# Patient Record
Sex: MALE | Race: WHITE
[De-identification: names, ages, dates, MRNs, and addresses within clinical notes are randomized per-mention and may not be internally consistent; named-entity substitution may affect disease eponyms.]

---

## 2018-02-24 ENCOUNTER — HOSPITAL ENCOUNTER (EMERGENCY)
Dept: HOSPITAL 75 - ER | Age: 38
LOS: 1 days | Discharge: HOME | End: 2018-02-25
Payer: MEDICARE

## 2018-02-24 VITALS — WEIGHT: 185 LBS | BODY MASS INDEX: 30.82 KG/M2 | HEIGHT: 65 IN

## 2018-02-24 DIAGNOSIS — R07.89: ICD-10-CM

## 2018-02-24 DIAGNOSIS — R10.13: Primary | ICD-10-CM

## 2018-02-24 DIAGNOSIS — F17.210: ICD-10-CM

## 2018-02-24 DIAGNOSIS — R06.00: ICD-10-CM

## 2018-02-24 PROCEDURE — 93005 ELECTROCARDIOGRAM TRACING: CPT

## 2018-02-24 PROCEDURE — 94640 AIRWAY INHALATION TREATMENT: CPT

## 2018-02-24 PROCEDURE — 94664 DEMO&/EVAL PT USE INHALER: CPT

## 2018-02-24 PROCEDURE — 83735 ASSAY OF MAGNESIUM: CPT

## 2018-02-24 PROCEDURE — 71046 X-RAY EXAM CHEST 2 VIEWS: CPT

## 2018-02-24 PROCEDURE — 85025 COMPLETE CBC W/AUTO DIFF WBC: CPT

## 2018-02-24 PROCEDURE — 83690 ASSAY OF LIPASE: CPT

## 2018-02-24 PROCEDURE — 80053 COMPREHEN METABOLIC PANEL: CPT

## 2018-02-24 PROCEDURE — 36415 COLL VENOUS BLD VENIPUNCTURE: CPT

## 2018-02-24 PROCEDURE — 84484 ASSAY OF TROPONIN QUANT: CPT

## 2018-02-24 NOTE — XMS REPORT
[facility]

 Created on: 2016



Torie  Jovanni

External Reference #: [id number]

: 1980

Sex: Male



Demographics







 Address  [address] [place] [address]  [address]

 

 Home Phone  [phone]

 

 Preferred Language  Unknown

 

 Marital Status  Unknown

 

 Shinto Affiliation  Unknown

 

 Race  White

 

 Ethnic Group  Not  or 





Author







 Author  MICHELLE PRABHAKAR

 

 Organization  [employer]

 

 Address  Unknown

 

 Phone  Unavailable







Care Team Providers







 Care Team Member Name  Role  Phone

 

 MICHELLE PRABHAKAR  CP  Unavailable



                                                                



Allergies

          No Known Allergies                                                   
                                     



Problems

          





 Problem Type  Condition  Code  Onset Dates  Condition Status

 

 Problem  Panic disorder  F41.0     Active

 

 Problem  Affective disorder  F39     Active

 

 Problem  Unspecified mood [affective] disorder  F39     Active



                                                                               
                             



Medications

          No Known Medications                                                 
                             



Results

          No Known Results                                                     
               



Summary Purpose

          eClinicalWorks Submission

## 2018-02-24 NOTE — XMS REPORT
[facility]

 Created on: 11/10/2016



Jovanni Vogt

External Reference #: [id number]

: 1980

Sex: Male



Demographics







 Address  [address] [place] [address]  [address]

 

 Home Phone  [phone]

 

 Preferred Language  Unknown

 

 Marital Status  Unknown

 

 Mandaeism Affiliation  Unknown

 

 Race  White

 

 Ethnic Group  Not  or 





Author







 Author  CHRISTINE MORRISON

 

 Organization  [employer]

 

 Address  Unknown

 

 Phone  Unavailable







Care Team Providers







 Care Team Member Name  Role  Phone

 

 CHRISTINE MORRISON  CP  Unavailable



                                                                



Allergies

          No Known Allergies                                                   
                                     



Problems

          





 Problem Type  Condition  Code  Onset Dates  Condition Status

 

 Assessment  Panic disorder  F41.0     Active

 

 Problem  Pain in joint, lower leg  719.46     Active

 

 Assessment  Affective disorder  F39     Active

 

 Problem  Panic disorder  F41.0     Active

 

 Problem  Affective disorder  F39     Active

 

 Problem  Unspecified mood [affective] disorder  F39     Active

 

 Problem  Encounter for long-term (current) use of other medications  V58.69   
  Active

 

 Problem  Nondependent tobacco use disorder  305.1     Active

 

 Problem  Unspecified episodic mood disorder  296.90     Active

 

 Problem  Lumbago  724.2     Active



                                                                               
                                                                               
                    



Medications

          No Known Medications                                                 
                                       



Procedures

          





 Procedure  Coding System  Code  Date

 

 Psychotherapy, patient &/family, 30 minutes, established patient  CPT-4  84004
  2016

 

 [facility] VISIT MENTAL HEALTH ESTAB PT  CPT-4    2016



                                                                               
         



Results

          No Known Results                                                     
               



Summary Purpose

          eClinicalWorks Submission

## 2018-02-24 NOTE — XMS REPORT
[facility]

 Created on: 10/27/2016



Jovanni Vogt

External Reference #: [id number]

: 1980

Sex: Male



Demographics







 Address  [address] [place] [address]  [address]

 

 Home Phone  [phone]

 

 Preferred Language  Unknown

 

 Marital Status  Unknown

 

 Holiness Affiliation  Unknown

 

 Race  White

 

 Ethnic Group  Not  or 





Author







 Author  CHRISTINE MORRISON

 

 Organization  [employer]

 

 Address  Unknown

 

 Phone  Unavailable







Care Team Providers







 Care Team Member Name  Role  Phone

 

 CHRISTINE MORRISON  CP  Unavailable



                                                                



Allergies

          No Known Allergies                                                   
                                     



Problems

          





 Problem Type  Condition  Code  Onset Dates  Condition Status

 

 Assessment  Affective disorder  F39     Active

 

 Problem  Pain in joint, lower leg  719.46     Active

 

 Assessment  Panic disorder  F41.0     Active

 

 Problem  Panic disorder  F41.0     Active

 

 Problem  Affective disorder  F39     Active

 

 Problem  Unspecified mood [affective] disorder  F39     Active

 

 Problem  Encounter for long-term (current) use of other medications  V58.69   
  Active

 

 Problem  Nondependent tobacco use disorder  305.1     Active

 

 Problem  Unspecified episodic mood disorder  296.90     Active

 

 Problem  Lumbago  724.2     Active



                                                                               
                                                                               
                    



Medications

          No Known Medications                                                 
                                       



Procedures

          





 Procedure  Coding System  Code  Date

 

 Psychotherapy, patient &/family, 45 minutes, new patient  CPT-4  50377  Oct 26
, 2016



                                                                              



Results

          No Known Results                                                     
               



Summary Purpose

          eClinicalWorks Submission

## 2018-02-24 NOTE — XMS REPORT
[facility]

 Created on: 2016



Jovanni Vogt

External Reference #: [id number]

: 1980

Sex: Male



Demographics







 Address  [address] [place] [address]  [address]

 

 Home Phone  [phone]

 

 Preferred Language  Unknown

 

 Marital Status  Unknown

 

 Rastafarian Affiliation  Unknown

 

 Race  White

 

 Ethnic Group  Not  or 





Author







 Author  ROX MORRISON

 

 Organization  [employer]

 

 Address  Unknown

 

 Phone  Unavailable







Care Team Providers







 Care Team Member Name  Role  Phone

 

 ROX MORRISON  CP  Unavailable



                                                                



Allergies, Adverse Reactions, Alerts

          





 Substance  Reaction  Event Type

 

 Penicillin V Potassium  Info Not Available  Drug Allergy

 

 Codeine Sulfate  Info Not Available  Drug Allergy

 

 Bee Stings  Info Not Available  Non Drug Allergy



                                                                               
                             



Problems

          





 Problem Type  Condition  Code  Onset Dates  Condition Status

 

 Problem  Panic disorder  F41.0     Active

 

 Problem  Affective disorder  F39     Active

 

 Problem  Unspecified mood [affective] disorder  F39     Active

 

 Assessment  Panic disorder  F41.0     Active



                                                                               
                                       



Medications

          





 Medication  Code System  Code  Instructions  Start Date  End Date  Status  
Dosage

 

 Escitalopram Oxalate  NDC  [address]-30  10 MG Orally Once a day  Nov 15, 
2016        0.5 tablet X 2 weeks then 1 tablet



                                                                               
         



Procedures

          





 Procedure  Coding System  Code  Date

 

  Office Visit, New Pt., Level 3  CPT-4  68120  Nov 15, 2016

 

 [facility] VISIT NEW PATIENT  CPT-4    Nov 15, 2016



                                                                               
                             



Vital Signs

          





 Date/Time:  Nov 15, 2016

 

 Cardiac Monitoring Heart Rate  88 bpm

 

 Weight  179.5 lbs

 

 Height  67 in

 

 BMI  28.11 Index

 

 Blood Pressure Diastolic  76 mmHg

 

 Blood Pressure Systolic  128 mmHg



                                                                              



Results

          No Known Results                                                     
               



Summary Purpose

          eClinicalWorks Submission

## 2018-02-24 NOTE — XMS REPORT
[facility]

 Created on: 2016



Jovanni Vogt

External Reference #: [id number]

: 1980

Sex: Male



Demographics







 Address  [address] [place] [address]  [address]

 

 Home Phone  [phone]

 

 Preferred Language  Unknown

 

 Marital Status  Unknown

 

 Mosque Affiliation  Unknown

 

 Race  White

 

 Ethnic Group  Not  or 





Author







 Author  MICHAEL MCCLURE

 

 Organization  [employer]

 

 Address  Unknown

 

 Phone  Unavailable







Care Team Providers







 Care Team Member Name  Role  Phone

 

 MICHAEL MCCLURE  CP  Unavailable



                                                                



Allergies, Adverse Reactions, Alerts

          





 Substance  Reaction  Event Type

 

 Penicillin V Potassium  Info Not Available  Drug Allergy

 

 Codeine Sulfate  Info Not Available  Drug Allergy



                                                                               
                   



Problems

          





 Problem Type  Condition  Code  Onset Dates  Condition Status

 

 Problem  Lumbago  724.2     Active

 

 Problem  Encounter for long-term (current) use of other medications  V58.69   
  Active

 

 Problem  Unspecified episodic mood disorder  296.90     Active

 

 Assessment  Dental caries  K02.9     Active

 

 Assessment  Dental examination  Z01.20     Active

 

 Problem  Nondependent tobacco use disorder  305.1     Active

 

 Problem  Pain in joint, lower leg  719.46     Active



                                                                               
                                                                     



Medications

          No Known Medications                                                 
                                       



Procedures

          





 Procedure  Coding System  Code  Date

 

 INTRAORL-PERIAPICAL 1 FILM 48651  CPT-4    May 24, 2016

 

 EXTRAC ERUPTED TOOTH/EXPOSED ROOT  CPT-4    May 24, 2016

 

 LTD ORAL EVALUATION - PROBLEM FOCUS  CPT-4    May 24, 2016



                                                                               
                                       



Vital Signs

          





 Date/Time:  May 24, 2016

 

 Blood Pressure Diastolic  73 mmHg

 

 Blood Pressure Systolic  118 mmHg

 

 Height  67 in



                                                                              



Results

          No Known Results                                                     
               



Summary Purpose

          eClinicalWorks Submission

## 2018-02-24 NOTE — XMS REPORT
Continuity of Care Document

 Created on: 2018



GROTHEER, PALMIRA L

External Reference #: [id number]

: 1980

Sex: Male



Demographics







 Address  [address] [place]  [address]

 

 Home Phone  [phone] x

 

 Preferred Language  Unknown

 

 Marital Status  Unknown

 

 Episcopal Affiliation  Unknown

 

 Race  Unknown

 

 Ethnic Group  Unknown





Author







 Author  [facility]

 

 Address  Unknown

 

 Phone  Unavailable



              



Allergies

      





 Active            Description            Code            Type            
Severity            Reaction            Onset            Reported/Identified   
         Relationship to Patient            Clinical Status        

 

 Yes            Penicillins                         Drug Allergy            N/A
            N/A                         2014                             
     



                  



Medications

      



There is no data.                  



Problems

      





 Date Dx Coded            Attending            Type            Code            
Diagnosis            Diagnosed By        

 

 2011            FILOMENA GUEVARA LCPC                         307.42     
       PERSISTENT DISORDER OF INITIATING OR MAINTAINING SLEEP                  
   

 

 2011            FILOMENA GUEVARA LCPC                         314.01     
       ADHD COMBINED                     

 

 2011            WILL LEROY                         307.42 
           PERSISTENT DISORDER OF INITIATING OR MAINTAINING SLEEP              
       

 

 2011            WILL LEROY                         314.01 
           ADHD COMBINED                     

 

 2011            WILL LEROY                         307.42 
           PERSISTENT DISORDER OF INITIATING OR MAINTAINING SLEEP              
       

 

 2011            WILL LEROY                         314.01 
           ADHD COMBINED                     

 

 2011            STEPHEN FULTON DO                         307.42         
   PERSISTENT DISORDER OF INITIATING OR MAINTAINING SLEEP                     

 

 2011            STEPHEN FULTON DO                         314.01         
   ADHD COMBINED                     

 

 2014            FILOMENA GUEVARA LCPC                         296.90     
       MOOD DISORDER NOS                     

 

 2014            WILL LEROY                         296.90 
           MOOD DISORDER NOS                     

 

 2014            WILL LEROY                         296.90 
           MOOD DISORDER NOS                     

 

 2014            STEPHEN FULTON DO                         296.90         
   MOOD DISORDER NOS                     

 

 2014            STEPHEN FULTON DO                         305.1          
  TOBACCO ABUSE                     

 

 2014            STEPHEN FULTON DO                         719.46         
   PAIN IN JOINT INVOLVING LOWER LEG                     

 

 2014            STEPHEN FULTON DO                         724.2          
  LUMBAGO                     

 

 2014            STEPHEN FULTON DO                         V58.69         
   LONG-TERM (CURRENT) USE OF OTHER MEDICATIONS                     



                                                



Procedures

      





 Code            Description            Performed By            Performed On   
     

 

             72451                                  PSYCH DIAGNOSTIC EVALUATION
                                   2014        

 

             59147                                  PSYCH DIAG EVAL W/MED SRVCS
                                   04/15/2014        

 

             20640                                  ROUTINE VENIPUNCTURE       
                            2014        

 

             98836                                  XRAY THORACIC SPINE 2 VIEWS
                                   2014        

 

             11295                                  XRAY LUMBAR SPINE 2 OR 3 
VIEWS                                   2014        

 

             71420                                  CMP                        
           2014        

 

             70937                                  CBC                        
           2014        



                              



Results

      



There is no data.              



Encounters

      





 ACCT No.            Visit Date/Time            Discharge            Status    
        Pt. Type            Provider            Facility            Loc./Unit  
          Complaint        

 

 [id number]            2014 13:58:00            2014 23:59:59          
  CLS            Outpatient            STEPHEN FULTON DO                        
                       

 

 [id number]            2014 12:41:00            2014 23:59:59          
  CLS            Outpatient            [facility]                
                               

 

 [id number]            2014 12:41:00            2014 23:59:59          
  CLS            Outpatient            [facility]                
                               

 

 [id number]            2014 14:25:00            2014 23:59:59          
  CLS            Outpatient            FIL[facility] GUEVARA [facility]

## 2018-02-24 NOTE — XMS REPORT
[facility]

 Created on: 2015



Jovanni Vogt

External Reference #: [id number]

: 1980

Sex: Male



Demographics







 Address  [address] [place]  [address]

 

 Home Phone  [phone]

 

 Preferred Language  Unknown

 

 Marital Status  Unknown

 

 Pentecostalism Affiliation  Unknown

 

 Race  White

 

 Ethnic Group  Not  or 





Author







 Author  MICHELLE PRABHAKAR

 

 Organization  [employer]

 

 Address  Unknown

 

 Phone  Unavailable







Care Team Providers







 Care Team Member Name  Role  Phone

 

 MICHELLE PRABHAKAR  CP  Unavailable



                                                                



Allergies, Adverse Reactions, Alerts

          





 Substance  Reaction  Event Type

 

 Penicillin V Potassium  Info Not Available  Drug Allergy



                                                                               
         



Problems

          





 Problem Type  Condition  ICD-9 Code  Onset Dates  Condition Status

 

 Problem  Lumbago  724.2     Active

 

 Problem  Encounter for long-term (current) use of other medications  V58.69   
  Active

 

 Problem  Unspecified episodic mood disorder  296.90     Active

 

 Assessment  Right knee pain  719.46     Active

 

 Problem  Nondependent tobacco use disorder  305.1     Active

 

 Problem  Pain in joint, lower leg  719.46     Active



                                                                               
                                                           



Medications

          





 Medication  Code System  Code  Instructions  Start Date  End Date  Status  
Dosage

 

 PredniSONE  NDC  [address]-50  10 MG Orally Twice a day       1 tablet with food or milk



                                                                               
         



Procedures

          





 Procedure  Coding System  Code  Date

 

 Office Visit, Est Pt., Level 3  CPT-4  77605  2015

 

 X-RAY EXAM OF KNEE, 3  CPT-4  58885  2015



                                                                               
                             



Vital Signs

          





 Date/Time:  2015

 

 Temperature  97.7 F

 

 Weight  172.0 lbs

 

 Height  67 in

 

 BMI  26.94 Index

 

 Blood Pressure Diastolic  80 mmHg

 

 Blood Pressure Systolic  130 mmHg

 

 Cardiac Monitoring Heart Rate  80 bpm



                                                                              



Results

          No Known Results                                                     
               



Summary Purpose

          eClinicalWorks Submission

## 2018-02-25 VITALS — DIASTOLIC BLOOD PRESSURE: 84 MMHG | SYSTOLIC BLOOD PRESSURE: 122 MMHG

## 2018-02-25 LAB
ALBUMIN SERPL-MCNC: 4.6 GM/DL (ref 3.2–4.5)
ALP SERPL-CCNC: 73 U/L (ref 40–136)
ALT SERPL-CCNC: 40 U/L (ref 0–55)
BASOPHILS # BLD AUTO: 0.1 10^3/UL (ref 0–0.1)
BASOPHILS NFR BLD AUTO: 1 % (ref 0–10)
BILIRUB SERPL-MCNC: 0.3 MG/DL (ref 0.1–1)
BUN/CREAT SERPL: 25
CALCIUM SERPL-MCNC: 9.4 MG/DL (ref 8.5–10.1)
CHLORIDE SERPL-SCNC: 106 MMOL/L (ref 98–107)
CO2 SERPL-SCNC: 19 MMOL/L (ref 21–32)
CREAT SERPL-MCNC: 0.79 MG/DL (ref 0.6–1.3)
EOSINOPHIL # BLD AUTO: 0.2 10^3/UL (ref 0–0.3)
EOSINOPHIL NFR BLD AUTO: 2 % (ref 0–10)
ERYTHROCYTE [DISTWIDTH] IN BLOOD BY AUTOMATED COUNT: 13.3 % (ref 10–14.5)
GFR SERPLBLD BASED ON 1.73 SQ M-ARVRAT: > 60 ML/MIN
GLUCOSE SERPL-MCNC: 134 MG/DL (ref 70–105)
HCT VFR BLD CALC: 43 % (ref 40–54)
HGB BLD-MCNC: 15.3 G/DL (ref 13.3–17.7)
LIPASE SERPL-CCNC: 17 U/L (ref 8–78)
LYMPHOCYTES # BLD AUTO: 3.7 X 10^3 (ref 1–4)
LYMPHOCYTES NFR BLD AUTO: 32 % (ref 12–44)
MAGNESIUM SERPL-MCNC: 2.4 MG/DL (ref 1.8–2.4)
MANUAL DIFFERENTIAL PERFORMED BLD QL: NO
MCH RBC QN AUTO: 31 PG (ref 25–34)
MCHC RBC AUTO-ENTMCNC: 36 G/DL (ref 32–36)
MCV RBC AUTO: 88 FL (ref 80–99)
MONOCYTES # BLD AUTO: 0.8 X 10^3 (ref 0–1)
MONOCYTES NFR BLD AUTO: 7 % (ref 0–12)
NEUTROPHILS # BLD AUTO: 6.6 X 10^3 (ref 1.8–7.8)
NEUTROPHILS NFR BLD AUTO: 59 % (ref 42–75)
PLATELET # BLD: 281 10^3/UL (ref 130–400)
PMV BLD AUTO: 10.6 FL (ref 7.4–10.4)
POTASSIUM SERPL-SCNC: 3.8 MMOL/L (ref 3.6–5)
PROT SERPL-MCNC: 7.8 GM/DL (ref 6.4–8.2)
RBC # BLD AUTO: 4.88 10^6/UL (ref 4.35–5.85)
SODIUM SERPL-SCNC: 138 MMOL/L (ref 135–145)
WBC # BLD AUTO: 11.3 10^3/UL (ref 4.3–11)

## 2018-02-25 NOTE — ED CHEST PAIN
General


Chief Complaint:  Chest Pain


Stated Complaint:  CHEST PRESSURE SOA DIZZY


Nursing Triage Note:  


PT REPORTS CHEST PAIN SINCE 1030 YESTERDAY MORNING.


Nursing Sepsis Screen:  No Definite Risk


Source:  patient


Exam Limitations:  no limitations





History of Present Illness


Date Seen by Provider:  Feb 24, 2018


Time Seen by Provider:  23:48


Initial Comments


This 37-year-old young man presents to the emergency room with chest pressure, 

and shortness of air.  He feels sometimes like he is gasping for air.  Symptoms 

started yesterday.  He does have some mild pain with deep inspiration.  He 

denies any cough or fever.  Position changes do seem to affect it.  Patient has 

tenderness in the epigastric area on exam.  He does smoke but he denies any 

drug use.  He rarely drinks alcohol.





Allergies and Home Medications


Allergies


Coded Allergies:  


     Penicillins (Verified  Allergy, Unknown, 2/25/18)





Home Medications


Albuterol Sulfate 1 Puff Puff, 1-2 PUFF IH Q4H PRN for SHORTNESS OF BREATH


   1 PUFF = 90 MCG 


   Prescribed by: KEVON GARCIA on 2/25/18 0109


Omeprazole 20 Mg Tablet.dr, 20 MG PO BID


   Prescribed by: KEVON GARCIA on 2/25/18 0109





Review of Systems


Constitutional:  no symptoms reported


EENTM:  Other (poor dentition)


Respiratory:  See HPI


Cardiovascular:  See HPI


Gastrointestinal:  See HPI


Genitourinary:  No Symptoms Reported


Musculoskeletal:  no symptoms reported


Skin:  no symptoms reported


Psychiatric/Neurological:  No Symptoms Reported


Endocrine:  No Symptoms Reported





Past Medical-Social-Family Hx


Patient Social History


Alcohol Use:  Occasionally Uses


Recreational Drug Use:  No


Smoking Status:  Current Everyday Smoker


Type Used:  Cigarettes


2nd Hand Smoke Exposure:  Yes


Recent Foreign Travel:  No


Contact w/Someone Who Travel:  No


Recent Infectious Disease Expo:  No


Recent Hopitalizations:  No





Seasonal Allergies


Seasonal Allergies:  No





Surgeries


History of Surgeries:  Yes


Surgeries:  Orthopedic





Respiratory


History of Respiratory Disorde:  No





Cardiovascular


History of Cardiac Disorders:  Yes


Cardiac Disorders:  Heart Murmur





Neurological


History of Neurological Disord:  No





Reproductive System


Hx Reproductive Disorders:  No





Genitourinary


History of Genitourinary Disor:  No





Gastrointestinal


History of Gastrointestinal Di:  No





Musculoskeletal


History of Musculoskeletal Dis:  No





Endocrine


History of Endocrine Disorders:  No





HEENT


History of HEENT Disorders:  No





Cancer


History of Cancer:  No





Psychosocial


History of Psychiatric Problem:  No





Integumentary


History of Skin or Integumenta:  No





Family Medical History


Significant Family History:  Heart Disease, Diabetes, Stroke





Physical Exam


Vital Signs





Vital Signs - First Documented




















Capillary Refill : Less Than 3 Seconds


General Appearance:  WD/WN, Mild Distress


HEENT:  PERRL/EOMI, Normal ENT Inspection, Other (Very poor dentition)


Neck:  Normal Inspection


Respiratory:  No Accessory Muscle Use, No Respiratory Distress, Wheezing (Slight

)


Cardiovascular:  Regular Rate, Rhythm, No Edema, No Murmur


Gastrointestinal:  Normal Bowel Sounds, Soft, Tenderness (Epigastric)


Extremity:  Normal Inspection, No Pedal Edema


Neurologic/Psychiatric:  Alert, Oriented x3, No Motor/Sensory Deficits, Normal 

Mood/Affect, CNs II-XII Norm as Tested


Skin:  Normal Color, Warm/Dry





Progress/Results/Core Measures


Results/Orders


Lab Results





Laboratory Tests








Test


  2/24/18


23:35 Range/Units


 


 


White Blood Count


  11.3 H


  4.3-11.0


10^3/uL


 


Red Blood Count


  4.88 


  4.35-5.85


10^6/uL


 


Hemoglobin 15.3  13.3-17.7  G/DL


 


Hematocrit 43  40-54  %


 


Mean Corpuscular Volume 88  80-99  FL


 


Mean Corpuscular Hemoglobin 31  25-34  PG


 


Mean Corpuscular Hemoglobin


Concent 36 


  32-36  G/DL


 


 


Red Cell Distribution Width 13.3  10.0-14.5  %


 


Platelet Count


  281 


  130-400


10^3/uL


 


Mean Platelet Volume 10.6 H 7.4-10.4  FL


 


Neutrophils (%) (Auto) 59  42-75  %


 


Lymphocytes (%) (Auto) 32  12-44  %


 


Monocytes (%) (Auto) 7  0-12  %


 


Eosinophils (%) (Auto) 2  0-10  %


 


Basophils (%) (Auto) 1  0-10  %


 


Neutrophils # (Auto) 6.6  1.8-7.8  X 10^3


 


Lymphocytes # (Auto) 3.7  1.0-4.0  X 10^3


 


Monocytes # (Auto) 0.8  0.0-1.0  X 10^3


 


Eosinophils # (Auto)


  0.2 


  0.0-0.3


10^3/uL


 


Basophils # (Auto)


  0.1 


  0.0-0.1


10^3/uL


 


Sodium Level 138  135-145  MMOL/L


 


Potassium Level 3.8  3.6-5.0  MMOL/L


 


Chloride Level 106    MMOL/L


 


Carbon Dioxide Level 19 L 21-32  MMOL/L


 


Anion Gap 13  5-14  MMOL/L


 


Blood Urea Nitrogen 20 H 7-18  MG/DL


 


Creatinine


  0.79 


  0.60-1.30


MG/DL


 


Estimat Glomerular Filtration


Rate > 60 


   


 


 


BUN/Creatinine Ratio 25   


 


Glucose Level 134 H   MG/DL


 


Calcium Level 9.4  8.5-10.1  MG/DL


 


Magnesium Level 2.4  1.8-2.4  MG/DL


 


Total Bilirubin 0.3  0.1-1.0  MG/DL


 


Aspartate Amino Transf


(AST/SGOT) 27 


  5-34  U/L


 


 


Alanine Aminotransferase


(ALT/SGPT) 40 


  0-55  U/L


 


 


Alkaline Phosphatase 73    U/L


 


Troponin I < 0.30  <0.30  NG/ML


 


Total Protein 7.8  6.4-8.2  GM/DL


 


Albumin 4.6 H 3.2-4.5  GM/DL


 


Lipase 17  8-78  U/L








My Orders





Orders - KEVON THOMAS MD


Ekg Tracing (2/24/18 23:48)


Cbc With Automated Diff (2/25/18 00:00)


Comprehensive Metabolic Panel (2/25/18 00:00)


Lipase (2/25/18 00:00)


Magnesium (2/25/18 00:00)


Troponin I (2/25/18 00:00)


Saline Lock/Iv-Start (2/25/18 00:00)


Chest Pa/Lat (2 View) (2/25/18 00:00)


Albuterol/Ipra Inhalation Soln (Duoneb I (2/25/18 00:00)


Svn Sm Volume Nebulizer Rt-Rfs (2/25/18 00:00)


Lidocaine 2% Viscous 15 Ml (Xylocaine Vi (2/25/18 00:00)


Antacid  Suspension (Mylanta  Suspension (2/25/18 00:00)


Albuterol/Ipra Inhalation Soln (Duoneb I (2/25/18 00:05)


Antacid  Suspension (Mylanta  Suspension (2/25/18 00:06)


Lidocaine 2% Viscous 15 Ml (Xylocaine Vi (2/25/18 00:06)





Medications Given in ED





Vital Signs/I&O





Vital Sign - Last 12Hours








 2/24/18 2/24/18 2/25/18 2/25/18





 23:25 23:25 00:09 01:20


 


Temp 98.1   98.1


 


Pulse 84   80


 


Resp 16   16


 


B/P (MAP) 135/90 (105)   122/84 (105)


 


Pulse Ox 98  96 96


 


O2 Delivery Room Air Room Air Room Air Room Air














Blood Pressure Mean:  105








Progress Note :  


Progress Note


Workup was unremarkable.  GI cocktail improved the epigastric pain and 

tenderness on reexamination.  DuoNeb treatment improved the shortness of breath.





Diagnostic Imaging





   Diagonstic Imaging:  Xray


   Plain Films/CT/US/NM/MRI:  chest


Comments


Chest x-ray viewed by me.  Report not yet available.  No acute abnormalities 

appreciated.





Departure


Impression


Impression:  


 Primary Impression:  


 Epigastric pain


 Additional Impressions:  


 Atypical chest pain


 Dyspnea


 Qualified Codes:  R06.00 - Dyspnea, unspecified


Disposition:  01 HOME, SELF-CARE


Condition:  Improved





Departure-Patient Inst.


Decision time for Depature:  01:05


Referrals:  


[facility] (PCP/Family)


Primary Care Physician


Patient Instructions:  Acute Abdomen (Belly Pain), Chest Pain That Is Not 

Caused by the Heart (DC)





Add. Discharge Instructions:  


Taken an antacid such as omeprazole daily, or ranitidine or Pepcid in twice 

daily for at least 2 weeks.  Avoid the following: eating large meals, eating 

close to bedtime, alcohol, tobacco, caffeine, carbonation, chocolate, citrus 

fruits and juices, tomato products, mints, spicy foods, fatty or greasy foods, 

NSAID medications such as ibuprofen or naproxen, or anything else you know 

irritates your stomach.  Work toward quitting smoking.  Follow-up with your 

doctor later this week.  Use your inhaler as prescribed.  Return to care if 

symptoms worsen.














All discharge instructions reviewed with patient and/or family. Voiced 

understanding.


Scripts


Albuterol Sulfate (PROAIR HFA) 1 Puff Puff


1-2 PUFF IH Q4H Y for SHORTNESS OF BREATH, #1 PUFF


   1 PUFF = 90 MCG


   Prov: KEVON THOMAS MD         2/25/18 


Omeprazole (Omeprazole) 20 Mg Tablet.


20 MG PO BID, #60 TAB


   Prov: KEVON THOMAS MD         2/25/18











KEVON THOMAS MD Feb 25, 2018 01:09

## 2018-02-25 NOTE — DIAGNOSTIC IMAGING REPORT
INDICATION: Chest pain.



PA and lateral views were obtained.



FINDINGS: The heart size, mediastinal configuration, and

pulmonary vascularity are within normal limits. There is no

pleural effusion, pneumothorax, or pneumonia. The osseous

structures are unremarkable. 



IMPRESSION: No acute cardiopulmonary abnormality.



Dictated by: 



  Dictated on workstation # [id number]

## 2020-09-16 ENCOUNTER — HOSPITAL ENCOUNTER (EMERGENCY)
Dept: HOSPITAL 75 - ER | Age: 40
Discharge: HOME | End: 2020-09-16
Payer: MEDICARE

## 2020-09-16 VITALS — WEIGHT: 182.98 LBS | HEIGHT: 64.96 IN | BODY MASS INDEX: 30.49 KG/M2

## 2020-09-16 VITALS — DIASTOLIC BLOOD PRESSURE: 83 MMHG | SYSTOLIC BLOOD PRESSURE: 141 MMHG

## 2020-09-16 DIAGNOSIS — S62.631A: Primary | ICD-10-CM

## 2020-09-16 DIAGNOSIS — Z88.5: ICD-10-CM

## 2020-09-16 DIAGNOSIS — F17.210: ICD-10-CM
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
## 2020-09-16 DIAGNOSIS — Z23: ICD-10-CM

## 2020-09-16 DIAGNOSIS — S61.313A: ICD-10-CM

## 2020-09-16 DIAGNOSIS — Z82.49: ICD-10-CM

## 2020-09-16 DIAGNOSIS — Z88.0: ICD-10-CM

## 2020-09-16 PROCEDURE — 90715 TDAP VACCINE 7 YRS/> IM: CPT

## 2020-09-16 PROCEDURE — 73140 X-RAY EXAM OF FINGER(S): CPT

## 2020-09-16 PROCEDURE — 29130 APPL FINGER SPLINT STATIC: CPT

## 2020-09-16 PROCEDURE — 12032 INTMD RPR S/A/T/EXT 2.6-7.5: CPT

## 2020-09-16 NOTE — DIAGNOSTIC IMAGING REPORT
EXAMINATION: 

Left hand radiograph, single view. 

Left second finger radiographs, 2 additional views.



COMPARISON: 

None. 



HISTORY: 

40-year-old male, injury of the second and third digits with saw.





FINDINGS: 

There is a comminuted displaced fracture of the second distal

phalanx without intra-articular fracture extension. There is no

identified radiopaque foreign body. There is no additional

identified acute fracture. There is no subluxation or

dislocation. 



IMPRESSION: 

1. Comminuted displaced fracture of the second distal phalanx

without intra-articular fracture extension.

2. No identified radiopaque foreign body. 



 



Dictated by: 



  Dictated on workstation # [id number]

## 2020-09-16 NOTE — ED INTEGUMENTARY GENERAL
General


Stated Complaint:  SAW ACCIDENT;FINGER SEVERED


Source:  patient


Exam Limitations:  no limitations





History of Present Illness


Date Seen by Provider:  Sep 16, 2020


Time Seen by Provider:  14:44


Initial Comments


Patient presents to ER by private conveyance from home with chief complaint he 

just prior to arrival he was involved in a accident with a Skil saw versus the 

dorsum of his index finger and middle finger. He does not member the last time 

he had a tetanus vaccination. He does not have any significant medical history 

nor is he on blood thinners. He still has sensation in his fingers.





Allergies and Home Medications


Allergies


Coded Allergies:  


     Penicillins (Verified  Allergy, Unknown, 18)


     hydrocodone (Unverified  Adverse Reaction, Unknown, rash, 20)





Home Medications


Albuterol Sulfate 1 Puff Puff, 1-2 PUFF IH Q4H PRN for SHORTNESS OF BREATH


   1 PUFF = 90 MCG 


   Prescribed by: KEVON GARCIA on 18 010


Omeprazole 20 Mg Tablet.dr, 20 MG PO BID


   Prescribed by: KEVON GARCIA on 18 010





Patient Home Medication List


Home Medication List Reviewed:  Yes





Review of Systems


Review of Systems


Constitutional:  No chills, No diaphoresis


EENTM:  No ear discharge, No ear pain


Respiratory:  No cough, No short of breath


Cardiovascular:  No chest pain, No edema


Gastrointestinal:  No abdominal pain, No constipation, No diarrhea, No nausea


Genitourinary:  No discharge, No dysuria


Musculoskeletal:  No back pain, No joint pain





Past Medical-Social-Family Hx


Patient Social History


Recreational Drug Use:  No


Smoking Status:  Current Everyday Smoker


Type Used:  Cigarettes


2nd Hand Smoke Exposure:  Yes


Recent Hopitalizations:  No





Seasonal Allergies


Seasonal Allergies:  No





Past Medical History


Surgeries:  Yes


Orthopedic


Respiratory:  No


Cardiac:  Yes


Heart Murmur


Neurological:  No


Reproductive Disorders:  No


Genitourinary:  No


Gastrointestinal:  No


Musculoskeletal:  No


Endocrine:  No


HEENT:  No


Cancer:  No


Psychosocial:  No


Integumentary:  No





Family Medical History


Heart Disease, Diabetes, Stroke





Physical Exam


Vital Signs





Vital Signs - First Documented








 20





 14:53


 


Temp 37.0


 


Pulse 130


 


Resp 18


 


B/P (MAP) 181/100 (127)


 


Pulse Ox 100





Capillary Refill :


General Appearance:  WD/WN, moderate distress


HEENT:  PERRL/EOMI, pharynx normal


Neck:  full range of motion, supple, normal inspection


Cardiovascular:  normal peripheral pulses, regular rate, rhythm


Respiratory:  no respiratory distress, no accessory muscle use


Extremities:  other (there is a laceration going through three fourths of the 

distal tip of the index finger on the left hand. It encompasses the base of the 

nail and is held on by the lateral portion of the skin. Bone is fragmented and 

he does not have tenderness control of the distal phalanx anymore. Comminuted, 

open fracture)


Neurologic/Psychiatric:  alert, normal mood/affect, oriented x 3, other 

(innervation to the distal fingertip is intact.)


Skin:  other (Ragged linear laceration through the dorsum of the left hand 

second digit distal phalanx nail and the distal tip of the third finger distal 

phalanx.)





Procedures/Interventions





   Wound Location:  Upper Extremities


Other Wound Location


Left hand and index finger distal phalanx


   Wound Length (cm):  3


   Wound's Depth, Shape:  linear, nail-avulsed, contused tissue, bone, tendon 

(flexor tendon insertion site destroyed)


   Wound Explored:  no foreign body removed


   Irrigated w/ Saline (ccs):  100


   Betadine Prep?:  Yes (chlorhexidine)


   Anesthesia:  1% Lidocaine


   Volume Anesthetic (ccs):  4


   Wound Debrided:  minimal


   Suture:  Prolene


   Suture Size:  5-0


   Number of Sutures:  8


   Layer Closure?:  1


   Sterile Dressing Applied?:  Yes


Progress


In the usual fashion the finger was cleaned with chlorhexidine and a finger 

block was placed using 1% lidocaine.


When the finger was ascertained to be appropriately and anesthetized it was 

cleaned and explored and flushed thoroughly using chlorhexidine and sterile 

saline. Was then flushed with about 100 cc of sterile saline.


[place] was on for approximately 3 minutes.


The distal phalanx was comminuted, open fracture with no definite insertion site

for extensor tendon.


Skin edges were reapproximated and closed with 8 simple interrupted sutures 

using 5-0 Prolene.


Wound was re-cleaned and then treated with triple antibiotic ointment and a 

gauze dressing.


A aluminum splint was placed and darci taped to his adjacent finger.


Patient tolerated the procedure very well. Wound was hemostatic and had same 

range of motion after suturing.





Progress/Results/Core Measures


Results/Orders


My Orders





Orders - LIONEL GRACE


Fentanyl  Injection (Sublimaze Injection (20 15:00)


Dipht,Pertuss(Acell),Tet Adult (Boostrix (20 15:00)


Lidocaine 1% Inj 20 Ml (Xylocaine 1% Inj (20 15:00)


Finger(S) (20 14:58)


Cephalexin Capsule (Keflex Capsule) (20 16:15)





Medications Given in ED





Current Medications








 Medications  Dose


 Ordered  Sig/Elsi


 Route  Start Time


 Stop Time Status Last Admin


Dose Admin


 


 Diphtheria/


 Tetanus/Acell


 Pertussis  0.5 ml  ONCE ONCE


 IM  20 15:00


 20 15:01 DC 20 15:15


0.5 ML


 


 Fentanyl Citrate  75 mcg  ONCE  ONCE


 IM  20 15:00


 20 15:01 DC 20 15:15


75 MCG


 


 Lidocaine HCl  20 ml  ONCE  ONCE


 INJ  20 15:00


 20 15:01 DC 20 15:22


20 ML








Vital Signs/I&O











 20





 14:53


 


Temp 37.0


 


Pulse 130


 


Resp 18


 


B/P (MAP) 181/100 (127)


 


Pulse Ox 100











Progress


Progress Note :  


   Time:  15:10


Progress Note


Tetanus vaccination, cephalexin, fentanyl IM and some lidocaine for local block.

Plan to stitch the wound closed after an x-ray of the fingers does not reveal 

any foreign debris. Neurologically intact. After his pain is under better 

control will test the tendons.





Diagnostic Imaging





   Diagonstic Imaging:  Xray


   Plain Films/CT/US/NM/MRI:  hand


Comments


                 [facility] [place], [place]





NAME:   PALMIRA SPAULDING


MED REC#:   [id number]


ACCOUNT#:   [id number]


PT STATUS:   REG ER


:   1980


PHYSICIAN:   LIONEL GRACE MD


ADMIT DATE:   20/ER


                                   ***Draft***


Date of Exam:20





FINGER(S)








EXAMINATION: 


Left hand radiograph, single view. 


Left second finger radiographs, 2 additional views.





COMPARISON: 


None. 





HISTORY: 


40-year-old male, injury of the second and third digits with saw.








FINDINGS: 


There is a comminuted displaced fracture of the second distal


phalanx without intra-articular fracture extension. There is no


identified radiopaque foreign body. There is no additional


identified acute fracture. There is no subluxation or


dislocation. 





IMPRESSION: 


1. Comminuted displaced fracture of the second distal phalanx


without intra-articular fracture extension.


2. No identified radiopaque foreign body. 





 





  Dictated on workstation # [id number]








Dict:   20 1527


Trans:   20 1535


 [id number]





Interpreted by:     JOHNATHAN BARNETT MD


Electronically signed by:


   Reviewed:  Reviewed by Me





Departure


Impression





   Primary Impression:  


   Fracture of phalanx of hand


   Qualified Codes:  S62.609B - Fracture of unspecified phalanx of unspecified 

   finger, initial encounter for open fracture


   Additional Impressions:  


   Laceration of finger nail bed


   Qualified Codes:  S61.319A - Laceration without foreign body of unspecified 

   finger with damage to nail, initial encounter


   Finger, open wounds with tendon injury


   Qualified Codes:  S61.209A - Unspecified open wound of unspecified finger 

   without damage to nail, initial encounter


Disposition:  01 HOME, SELF-CARE


Condition:  Improved





Departure-Patient Inst.


Decision time for Depature:  16:25


Referrals:  


[facility] (PCP/Family)


Primary Care Physician








EMILY DOUGHERTY DO


Patient Instructions:  Common Finger Injuries (DC), Splint Care, Laceration R

epair With Stitches (DC)





Add. Discharge Instructions:  


Keep the wound clean routinely with water and soap.


You may apply a thin layer of Vaseline or triple antibiotic ointment after 

cleaning.


Dress it with clean, dry gauze dressing.


Tomorrow morning call Dr. Dougherty the hand surgeon and request follow-up 

appointment in 3-5 days.


Start taking the Keflex 3 times a day.


If you have swelling and pain you can elevate the hand above the level of your 

heart.


Tylenol 650 mg every 8 hours as necessary for pain.


Ibuprofen 800 mg every 8 hours as necessary for pain.


Hydrocodone one tablet every 6 hours as necessary for breakthrough pain.


Hydrocodone will cause constipation and drowsiness. You can use MiraLAX or 

Colace to stay regular.


Scripts


Cephalexin (Cephalexin) 500 Mg Tablet


500 MG PO TID for 7 Days, #21 TAB 0 Refills


   Prov: LIONEL GRACE         20


Work/School Note:  Work Release Form   Date Seen in the Emergency Department:  

Sep 16, 2020


   Return to Work:  Sep 17, 2020


   Restrictions:  Need Release from Doctor


   Other Restrictions Listed Below:  May not use the left hand until released by

physician.





Copy


Copies To 1:   EMILY DOUGHERTY TITUS J                 Sep 16, 2020 15:06

## 2023-06-26 ENCOUNTER — HOSPITAL ENCOUNTER (EMERGENCY)
Dept: HOSPITAL 75 - ER | Age: 43
Discharge: HOME | End: 2023-06-26
Payer: MEDICARE

## 2023-06-26 VITALS — BODY MASS INDEX: 29.4 KG/M2 | HEIGHT: 65.75 IN | WEIGHT: 180.78 LBS

## 2023-06-26 VITALS — SYSTOLIC BLOOD PRESSURE: 111 MMHG | DIASTOLIC BLOOD PRESSURE: 66 MMHG

## 2023-06-26 DIAGNOSIS — R07.2: Primary | ICD-10-CM

## 2023-06-26 DIAGNOSIS — F17.210: ICD-10-CM

## 2023-06-26 LAB
ALBUMIN SERPL-MCNC: 4.6 GM/DL (ref 3.2–4.5)
ALP SERPL-CCNC: 77 U/L (ref 40–136)
ALT SERPL-CCNC: 40 U/L (ref 0–55)
APTT BLD: 29 SEC (ref 24–35)
BASOPHILS # BLD AUTO: 0.1 10^3/UL (ref 0–0.1)
BASOPHILS NFR BLD AUTO: 1 % (ref 0–10)
BILIRUB SERPL-MCNC: 0.3 MG/DL (ref 0.1–1)
BUN/CREAT SERPL: 16
CALCIUM SERPL-MCNC: 9.5 MG/DL (ref 8.5–10.1)
CHLORIDE SERPL-SCNC: 107 MMOL/L (ref 98–107)
CO2 SERPL-SCNC: 26 MMOL/L (ref 21–32)
CREAT SERPL-MCNC: 0.86 MG/DL (ref 0.6–1.3)
EOSINOPHIL # BLD AUTO: 0.2 10^3/UL (ref 0–0.3)
EOSINOPHIL NFR BLD AUTO: 2 % (ref 0–10)
GFR SERPLBLD BASED ON 1.73 SQ M-ARVRAT: 112 ML/MIN
GLUCOSE SERPL-MCNC: 102 MG/DL (ref 70–105)
HCT VFR BLD CALC: 49 % (ref 40–54)
HGB BLD-MCNC: 16.7 G/DL (ref 13.3–17.7)
INR PPP: 0.9 (ref 0.8–1.4)
LYMPHOCYTES # BLD AUTO: 3 10^3/UL (ref 1–4)
LYMPHOCYTES NFR BLD AUTO: 29 % (ref 12–44)
MAGNESIUM SERPL-MCNC: 1.9 MG/DL (ref 1.6–2.4)
MANUAL DIFFERENTIAL PERFORMED BLD QL: NO
MCH RBC QN AUTO: 31 PG (ref 25–34)
MCHC RBC AUTO-ENTMCNC: 34 G/DL (ref 32–36)
MCV RBC AUTO: 90 FL (ref 80–99)
MONOCYTES # BLD AUTO: 0.7 10^3/UL (ref 0–1)
MONOCYTES NFR BLD AUTO: 7 % (ref 0–12)
NEUTROPHILS # BLD AUTO: 6.1 10^3/UL (ref 1.8–7.8)
NEUTROPHILS NFR BLD AUTO: 61 % (ref 42–75)
PLATELET # BLD: 253 10^3/UL (ref 130–400)
PMV BLD AUTO: 9.6 FL (ref 9–12.2)
POTASSIUM SERPL-SCNC: 4 MMOL/L (ref 3.6–5)
PROT SERPL-MCNC: 7.7 GM/DL (ref 6.4–8.2)
PROTHROMBIN TIME: 12.8 SEC (ref 12.2–14.7)
SODIUM SERPL-SCNC: 141 MMOL/L (ref 135–145)
WBC # BLD AUTO: 10 10^3/UL (ref 4.3–11)

## 2023-06-26 PROCEDURE — 93041 RHYTHM ECG TRACING: CPT

## 2023-06-26 PROCEDURE — 85610 PROTHROMBIN TIME: CPT

## 2023-06-26 PROCEDURE — 85025 COMPLETE CBC W/AUTO DIFF WBC: CPT

## 2023-06-26 PROCEDURE — 83874 ASSAY OF MYOGLOBIN: CPT

## 2023-06-26 PROCEDURE — 84484 ASSAY OF TROPONIN QUANT: CPT

## 2023-06-26 PROCEDURE — 93005 ELECTROCARDIOGRAM TRACING: CPT

## 2023-06-26 PROCEDURE — 36415 COLL VENOUS BLD VENIPUNCTURE: CPT

## 2023-06-26 PROCEDURE — 71045 X-RAY EXAM CHEST 1 VIEW: CPT

## 2023-06-26 PROCEDURE — 83690 ASSAY OF LIPASE: CPT

## 2023-06-26 PROCEDURE — 85730 THROMBOPLASTIN TIME PARTIAL: CPT

## 2023-06-26 PROCEDURE — 80053 COMPREHEN METABOLIC PANEL: CPT

## 2023-06-26 PROCEDURE — 83735 ASSAY OF MAGNESIUM: CPT

## 2023-06-26 NOTE — DIAGNOSTIC IMAGING REPORT
CHEST 1 VIEW, AP/PA ONLY



Indication: Chest pain. 



Comparison: 02/25/2018



Findings:

No focal airspace disease in the visualized lungs. No pleural

effusion or pneumothorax. Normal cardiomediastinal silhouette.



Impression: 

1. No acute cardiopulmonary process by portable radiography.



Dictated by: 



  Dictated on workstation # [id number]

## 2023-06-26 NOTE — ED CHEST PAIN
General


Chief Complaint:  Chest Pain


Stated Complaint:  CHEST PAINS | ELEVATED HEART RATE


Nursing Triage Note:  


PT AMB TO [facility] PT [facility] OF CHEST PAIN STARTED APPROX 1HR AGO. PT RATES PAIN 


6-10/10.





STATES B/P ELEVATED AT HOME.


Source:  patient


Exam Limitations:  no limitations


 (ABEL RUSSELL)





History of Present Illness


Date Seen by Provider:  Jun 26, 2023


Time Seen by Provider:  11:07


Initial Comments


Patient is a 43-year-old male who presents to ED with substernal chest pain.  

This started around 9 AM this morning just right after he woke up.  Sharp s

ubsternal constant chest pain.  Does have pressure to this area with associated 

shortness of breath pain with deep inspiration.  History of acid reflux states 

it feels somewhat similar but not as severe.  Rates pain 6-8 out of 10.  Patient

has a strong family cardiac history.  Denies diagnosis of hypertension, diabetes

or high cholesterol.  History of smoking.  Denies history of coronary artery 

disease or cardiac stents.  No recent travels or surgeries.  Denies any leg 

swelling.  Denies pain on palpation.  Denies abdominal pain, chills, body aches,

vomiting, nausea, diarrhea, cough, headache, dizziness.  denies taking anything 

for pain


 (ABEL RUSSELL)





Allergies and Home Medications


Allergies


Coded Allergies:  


     Penicillins (Verified  Allergy, Unknown, 2/25/18)


     hydrocodone (Unverified  Adverse Reaction, Unknown, rash, 9/16/20)





Patient Home Medication List


Home Medication List Reviewed:  Yes


 (ABEL RUSSELL)


Albuterol Sulfate (Ventolin Hfa) 1 Puff Puff, 1-2 PUFF IH Q4H PRN for SHORTNESS 

OF BREATH


   Prescribed by: KEVON GARCIA on 2/25/18 0109


Cephalexin (Cephalexin) 500 Mg Tablet, 500 MG PO TID


   Prescribed by: LIONEL GRACE on 9/16/20 1636


Omeprazole (Omeprazole) 20 Mg Tablet.dr, 20 MG PO BID


   Prescribed by: KEVON GARCIA on 2/25/18 0109


Oxycodone HCl/Acetaminophen (Percocet 5-325 mg Tablet) 1 Each Tablet, 1 TAB PO 

Q6H PRN for PAIN-BREAKTHROUGH


   Prescribed by: LIONEL GRACE on 9/16/20 1637





Review of Systems


Review of Systems


Constitutional:  No chills, No diaphoresis


EENTM:  No Eye Pain


Respiratory:  Denies Cough; Shortness of Air


Cardiovascular:  Chest Pain


Gastrointestinal:  Denies Abdominal Pain, Denies Diarrhea, Denies Nausea, Denies

Vomiting


Genitourinary:  Denies Burning, Denies Discharge, Denies Drainage, Denies 

Frequency


Musculoskeletal:  No back pain, No joint pain


Skin:  No change in color, No change in hair/nails (ABEL RUSSELL)





All Other Systems Reviewed


Negative Unless Noted:  Yes


 (ABEL RUSSELL)





Past Medical-Social-Family Hx


Patient Social History


Tobacco Use?:  Yes


Tobacco type used:  Cigarettes


Smoking Status:  Current Everyday Smoker


Substance use?:  No


Alcohol Use?:  Yes


Alcohol type:  Beer


Alcohol Frequency:  Once in a while


Pt feels they are or have been:  No


 (ABEL RUSSELL)





Immunizations Up To Date


Tetanus Booster (TDap):  Unknown


First/Initial COVID19 Vaccinat:  YES


Second COVID19 Vaccination Anmol:  YES


 (ABEL RUSSELL)





Seasonal Allergies


Seasonal Allergies:  No


 (ABEL RUSSELL)





Past Medical History


Surgery/Hospitalization HX:  


ARTHROSCOPIC MERT KNEE  SURG, DISABLED D/T MENTAL HEALTH


Surgeries:  Yes (L KNEE ARTHROSCOPY)


Orthopedic


Respiratory:  No


Cardiac:  No


Heart Murmur


Neurological:  No


Reproductive Disorders:  No


Genitourinary:  No


Gastrointestinal:  No


Musculoskeletal:  No


Endocrine:  No


HEENT:  No


Cancer:  No


Psychosocial:  No


Integumentary:  No


Blood Disorders:  No


 (ABEL RUSSELL)





Family Medical History


Heart Disease, Diabetes, Stroke


 (ABEL RUSSELL)





Physical Exam


Vital Signs





Vital Signs - First Documented








 6/26/23 6/26/23





 10:35 15:23


 


Temp 36.2 


 


Pulse 87 


 


Resp  18


 


B/P (MAP) 155/96 (115) 


 


Pulse Ox 98 


 


O2 Delivery  Room Air








 (KEVON THOMAS MD)


Vital Signs


Capillary Refill : Less Than 3 Seconds 


 (ABEL RUSSELL)


Height, Weight, BMI


Height: 5'5.00"


Weight: 185lbs. oz. 83.087736om; 29.00 BMI


Method:Stated


General Appearance:  No Apparent Distress, WD/WN


HEENT:  PERRL/EOMI, TMs Normal, Normal ENT Inspection, Pharynx Normal


Neck:  Full Range of Motion, Normal Inspection, Non Tender, Supple


Respiratory:  Chest Non Tender, Lungs Clear, Normal Breath Sounds, No Accessory 

Muscle Use, No Respiratory Distress


Cardiovascular:  Regular Rate, Rhythm, No Edema, No Gallop, No JVD, No Murmur


Gastrointestinal:  Normal Bowel Sounds, No Organomegaly, No Pulsatile Mass, Non 

Tender


Extremity:  Normal Capillary Refill, Normal Inspection, Normal Range of Motion, 

Non Tender


Neurologic/Psychiatric:  Alert, Oriented x3, No Motor/Sensory Deficits, Normal 

Mood/Affect, CNs II-XII Norm as Tested


Skin:  Normal Color, Warm/Dry (ABEL RUSSELL)





Procedures/Interventions





   Suture Size:  5-0


 (ABEL RUSSELL)





Progress/Results/Core Measures


Results/Orders


Lab Results





Laboratory Tests








Test


 6/26/23


10:46 6/26/23


14:13 Range/Units


 


 


White Blood Count


 10.0 


 


 4.3-11.0


10^3/uL


 


Red Blood Count


 5.38 


 


 4.30-5.52


10^6/uL


 


Hemoglobin 16.7   13.3-17.7  g/dL


 


Hematocrit 49   40-54  %


 


Mean Corpuscular Volume 90   80-99  fL


 


Mean Corpuscular Hemoglobin 31   25-34  pg


 


Mean Corpuscular Hemoglobin


Concent 34 


 


 32-36  g/dL





 


Red Cell Distribution Width 13.2   10.0-14.5  %


 


Platelet Count


 253 


 


 130-400


10^3/uL


 


Mean Platelet Volume 9.6   9.0-12.2  fL


 


Immature Granulocyte % (Auto) 0    %


 


Neutrophils (%) (Auto) 61   42-75  %


 


Lymphocytes (%) (Auto) 29   12-44  %


 


Monocytes (%) (Auto) 7   0-12  %


 


Eosinophils (%) (Auto) 2   0-10  %


 


Basophils (%) (Auto) 1   0-10  %


 


Neutrophils # (Auto)


 6.1 


 


 1.8-7.8


10^3/uL


 


Lymphocytes # (Auto)


 3.0 


 


 1.0-4.0


10^3/uL


 


Monocytes # (Auto)


 0.7 


 


 0.0-1.0


10^3/uL


 


Eosinophils # (Auto)


 0.2 


 


 0.0-0.3


10^3/uL


 


Basophils # (Auto)


 0.1 


 


 0.0-0.1


10^3/uL


 


Immature Granulocyte # (Auto)


 0.0 


 


 0.0-0.1


10^3/uL


 


Prothrombin Time 12.8   12.2-14.7  SEC


 


INR Comment 0.9   0.8-1.4  


 


Activated Partial


Thromboplast Time 29 


 


 24-35  SEC





 


Sodium Level 141   135-145  MMOL/L


 


Potassium Level 4.0   3.6-5.0  MMOL/L


 


Chloride Level 107     MMOL/L


 


Carbon Dioxide Level 26   21-32  MMOL/L


 


Anion Gap 8   5-14  MMOL/L


 


Blood Urea Nitrogen 14   7-18  MG/DL


 


Creatinine


 0.86 


 


 0.60-1.30


MG/DL


 


Estimat Glomerular Filtration


Rate 112 


 


  





 


BUN/Creatinine Ratio 16    


 


Glucose Level 102     MG/DL


 


Calcium Level 9.5   8.5-10.1  MG/DL


 


Corrected Calcium    8.5-10.1  MG/DL


 


Magnesium Level 1.9   1.6-2.4  MG/DL


 


Total Bilirubin 0.3   0.1-1.0  MG/DL


 


Aspartate Amino Transf


(AST/SGOT) 27 


 


 5-34  U/L





 


Alanine Aminotransferase


(ALT/SGPT) 40 


 


 0-55  U/L





 


Alkaline Phosphatase 77     U/L


 


Myoglobin


 29.3 


 


 10.0-92.0


NG/ML


 


Troponin I < 0.028  < 0.028  <0.028  NG/ML


 


Total Protein 7.7   6.4-8.2  GM/DL


 


Albumin 4.6 H  3.2-4.5  GM/DL


 


Lipase 12   8-78  U/L





 (KEVON THOMAS MD)


My Orders





Orders - KEVON THOMAS MD


Ekg Tracing (6/26/23 10:40)


Cbc With Automated Diff (6/26/23 10:46)


Magnesium (6/26/23 10:46)


Chest 1 View, Ap/Pa Only (6/26/23 10:46)


Comprehensive Metabolic Panel (6/26/23 10:46)


Myoglobin Serum (6/26/23 10:46)


Protime With Inr (6/26/23 10:46)


Partial Thromboplastin Time (6/26/23 10:46)


O2 (6/26/23 10:46)


Monitor-Rhythm Ecg Trace Only (6/26/23 10:46)


Ed Iv/Invasive Line Start (6/26/23 10:46)


Troponin I Keesha (6/26/23 10:46)


 (KEVON THOMAS MD)


Medications Given in ED





Current Medications








 Medications  Dose


 Ordered  Sig/Elsi


 Route  Start Time


 Stop Time Status Last Admin


Dose Admin


 


 Al Hydrox/Mg


 Hydrox/Simethicone  30 ml  ONCE  ONCE


 PO  6/26/23 11:15


 6/26/23 11:16 DC 6/26/23 11:13


30 ML


 


 Aspirin  324 mg  ONCE  ONCE


 PO  6/26/23 11:15


 6/26/23 11:16 DC 6/26/23 11:13


324 MG


 


 Ketorolac


 Tromethamine  30 mg  ONCE  ONCE


 IVP  6/26/23 12:00


 6/26/23 12:01 DC 6/26/23 12:32


30 MG


 


 Lidocaine HCl  15 ml  ONCE  ONCE


 PO  6/26/23 11:15


 6/26/23 11:16 DC 6/26/23 11:13


15 ML





 (KEVON THOMAS MD)


Vital Signs/I&O











 6/26/23 6/26/23





 10:35 15:23


 


Temp 36.2 36.9


 


Pulse 87 64


 


Resp  18


 


B/P (MAP) 155/96 (115) 111/66


 


Pulse Ox 98 97


 


O2 Delivery  Room Air





 (KEVON THOMAS MD)








Blood Pressure Mean:                    115








Comment


Sinus rhythm, possible right ventricular conduction delay, 86 bpm, QRS duration 

86 MS, QTc 384 MS


 (ABEL RUSSELL)





Departure


Communication (PCP)


Reviewed previous ER visits, H&P, lab testing.  Differential diagnosis 

esophagitis, GERD, gastritis, ACS, pericarditis, myocarditis, PE.  Patient 

presents to ED with acute onset  chest pain right after he woke up. Pain 6 out 

of 10.  Patient states he drank some coffee right before. No known cardiac 

history but a strong family cardiac history.  History of smoking.  Denies 

diagnosis of hypertension, diabetes or high cholesterol.  EKG without evidence 

of ST elevation or depression.  Cardiac work-up, chest x-ray.  Patient was given

a full 324 aspirin.  Patient has no abdominal tenderness.  Chemistry grossly 

unremarkable.  Normal hematology.  No Focal neural deficits chest x-ray was 

negative for pneumonia, pneumothorax.  No evidence of mediastinum widening.  GI 

cocktail with some improvement but was still having pain rates 3 out 10.  

Refused morphine received Toradol with some improvement.  CBC, CMP troponin 

unremarkable.  He is not tachycardic or febrile.  No recent travels or surgeries

or PE risk factors.  Patient does not appear toxic or septic.  delta level 3-

hour troponin since the low heart score.  Heart score of 2.  Delta troponin was 

negative.  Patient remained asymptomatic.  Patient is scheduled to follow-up 

with cardiology outpatient.  Discussed taking Protonix, Pepcid or omeprazole.  

Mylanta or Tums if pain with eating.  If increasing chest pain short of breath 

to return back to ED.  Follow-up your PCP in 2 to 3 days for reevaluation


 (ABEL RUSSELL)





Impression





   Primary Impression:  


   Chest pain


Disposition:  01 HOME, SELF-CARE


Condition:  Stable





Departure-Patient Inst.


Decision time for Depature:  14:56


 (ABEL RUSSELL)


Referrals:  


[facility] [place]


Primary Care Physician








JEANIE WILSON MD


Patient Instructions:  Chest Pain (DC)





Add. Discharge Instructions:  


If any worsening pain to return back to ED.  Follow-up your PCP 2 to 3 days for 

reevaluation.  May consider omeprazole, Pepcid, Tums or Mylanta if pain with 

eating.  If any worsening chest pain short of breath to return back to ED





All discharge instructions reviewed with patient and/or family. Voiced 

understanding.








ATTENDING PHYSICIAN NOTE:


I was physically present as attending physician in the emergency department 

during the care of this patient, but I was not directly involved in the decision

making or delivery of care for this patient. 


 (KEVON THOMAS MD)











ABEL RUSSELL          Jun 26, 2023 11:09


KEVON THOMAS MD        Jun 26, 2023 19:49